# Patient Record
Sex: MALE | Race: WHITE | NOT HISPANIC OR LATINO | Employment: FULL TIME | URBAN - METROPOLITAN AREA
[De-identification: names, ages, dates, MRNs, and addresses within clinical notes are randomized per-mention and may not be internally consistent; named-entity substitution may affect disease eponyms.]

---
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
## 2024-03-05 ENCOUNTER — HOSPITAL ENCOUNTER (EMERGENCY)
Facility: HOSPITAL | Age: 19
Discharge: HOME | End: 2024-03-05
Attending: EMERGENCY MEDICINE

## 2024-03-05 VITALS
RESPIRATION RATE: 16 BRPM | OXYGEN SATURATION: 97 % | WEIGHT: 139 LBS | TEMPERATURE: 98.8 F | BODY MASS INDEX: 20.53 KG/M2 | HEART RATE: 106 BPM | DIASTOLIC BLOOD PRESSURE: 95 MMHG | SYSTOLIC BLOOD PRESSURE: 167 MMHG

## 2024-03-05 DIAGNOSIS — F41.9 ANXIETY: ICD-10-CM

## 2024-03-05 LAB
ATRIAL RATE: 80 BPM
ATRIAL RATE: 84 BPM
GLUCOSE SERPL-MCNC: 99 MG/DL (ref 65–140)
P AXIS: 52 DEGREES
P AXIS: 69 DEGREES
PR INTERVAL: 138 MS
PR INTERVAL: 144 MS
QRS AXIS: 62 DEGREES
QRS AXIS: 62 DEGREES
QRSD INTERVAL: 94 MS
QRSD INTERVAL: 98 MS
QT INTERVAL: 358 MS
QT INTERVAL: 374 MS
QTC INTERVAL: 423 MS
QTC INTERVAL: 431 MS
T WAVE AXIS: 51 DEGREES
T WAVE AXIS: 53 DEGREES
VENTRICULAR RATE: 80 BPM
VENTRICULAR RATE: 84 BPM

## 2024-03-05 PROCEDURE — 93010 ELECTROCARDIOGRAM REPORT: CPT

## 2024-03-05 PROCEDURE — 99285 EMERGENCY DEPT VISIT HI MDM: CPT | Performed by: EMERGENCY MEDICINE

## 2024-03-05 PROCEDURE — 82948 REAGENT STRIP/BLOOD GLUCOSE: CPT

## 2024-03-05 PROCEDURE — 93005 ELECTROCARDIOGRAM TRACING: CPT

## 2024-03-05 PROCEDURE — 99283 EMERGENCY DEPT VISIT LOW MDM: CPT

## 2024-03-05 RX ORDER — LORAZEPAM 1 MG/1
1 TABLET ORAL ONCE
Status: COMPLETED | OUTPATIENT
Start: 2024-03-05 | End: 2024-03-05

## 2024-03-05 RX ORDER — HYDROXYZINE PAMOATE 50 MG/1
50 CAPSULE ORAL 4 TIMES DAILY PRN
Qty: 30 CAPSULE | Refills: 0 | Status: SHIPPED | OUTPATIENT
Start: 2024-03-05

## 2024-03-05 RX ADMIN — LORAZEPAM 1 MG: 1 TABLET ORAL at 16:55

## 2024-03-05 NOTE — ED PROVIDER NOTES
History  Chief Complaint   Patient presents with    Anxiety     Reports about an hour ago he started to feel like he was having a panic attack and began feeling shaky along with heart palpitations. Pt states he feels better now just anxious.     Patient is a 19-year-old male.  He has a history of anxiety.  He was watching [employer] video when all of a sudden he felt shaky.  He developed some palpitations.  No chest pain or shortness of breath.  Felt very anxious as if he was going to die.  No calf pain or hemoptysis.  No unilateral leg swelling.  Patient reports using marijuana, pretty much daily.  No other drug use.  No alcohol use.  He does feel better spontaneously since arrival in the emergency room, but still feels somewhat shaky.  Patient is not suicidal or homicidal.  No hallucinations.        None       Past Medical History:   Diagnosis Date    Known health problems: none        History reviewed. No pertinent surgical history.    Family History   Problem Relation Age of Onset    Alcohol abuse Mother     No Known Problems Father      I have reviewed and agree with the history as documented.    E-Cigarette/Vaping    E-Cigarette Use Never User      E-Cigarette/Vaping Substances    Nicotine Yes      Social History     Tobacco Use    Smoking status: Never    Smokeless tobacco: Never   Vaping Use    Vaping status: Never Used   Substance Use Topics    Alcohol use: Not Currently    Drug use: Yes     Types: Marijuana       Review of Systems   Constitutional:  Negative for chills and fever.   HENT:  Negative for rhinorrhea and sore throat.    Eyes:  Negative for pain, redness and visual disturbance.   Respiratory:  Negative for cough and shortness of breath.    Cardiovascular:  Positive for palpitations. Negative for chest pain and leg swelling.   Gastrointestinal:  Negative for abdominal pain, diarrhea and vomiting.   Endocrine: Negative for polydipsia and polyuria.   Genitourinary:  Negative for dysuria, frequency and  hematuria.   Musculoskeletal:  Negative for back pain and neck pain.   Skin:  Negative for rash and wound.   Allergic/Immunologic: Negative for immunocompromised state.   Neurological:  Negative for weakness, numbness and headaches.   Psychiatric/Behavioral:  Negative for hallucinations and suicidal ideas. The patient is nervous/anxious.    All other systems reviewed and are negative.      Physical Exam  Physical Exam  Vitals reviewed.   Constitutional:       General: He is not in acute distress.     Appearance: He is not toxic-appearing.   HENT:      Head: Normocephalic and atraumatic.      Nose: Nose normal.      Mouth/Throat:      Mouth: Mucous membranes are moist.   Eyes:      General:         Right eye: No discharge.         Left eye: No discharge.      Conjunctiva/sclera: Conjunctivae normal.   Cardiovascular:      Rate and Rhythm: Normal rate and regular rhythm.      Pulses: Normal pulses.      Heart sounds: Normal heart sounds. No murmur heard.     No friction rub. No gallop.   Pulmonary:      Effort: Pulmonary effort is normal. No respiratory distress.      Breath sounds: Normal breath sounds. No stridor. No wheezing, rhonchi or rales.   Abdominal:      General: Bowel sounds are normal. There is no distension.      Palpations: Abdomen is soft.      Tenderness: There is no abdominal tenderness. There is no right CVA tenderness, left CVA tenderness, guarding or rebound.   Musculoskeletal:         General: No swelling, tenderness, deformity or signs of injury. Normal range of motion.      Cervical back: Normal range of motion and neck supple. No rigidity.      Right lower leg: No edema.      Left lower leg: No edema.      Comments: No calf pain or unilateral leg swelling   Skin:     General: Skin is warm and dry.      Coloration: Skin is not jaundiced or pale.      Findings: No bruising, erythema or rash.   Neurological:      General: No focal deficit present.      Mental Status: He is alert and oriented to  "person, place, and time.      Cranial Nerves: No facial asymmetry.      Sensory: No sensory deficit.      Motor: Motor function is intact.   Psychiatric:         Mood and Affect: Mood normal.         Behavior: Behavior normal.         Vital Signs  ED Triage Vitals [03/05/24 1554]   Temperature Pulse Respirations Blood Pressure SpO2   98.8 °F (37.1 °C) (!) 106 16 167/95 97 %      Temp Source Heart Rate Source Patient Position - Orthostatic VS BP Location FiO2 (%)   Oral -- Lying Right arm --      Pain Score       No Pain           Vitals:    03/05/24 1554   BP: 167/95   Pulse: (!) 106   Patient Position - Orthostatic VS: Lying         Visual Acuity      ED Medications  Medications   LORazepam (ATIVAN) tablet 1 mg (1 mg Oral Given 3/5/24 1655)       Diagnostic Studies  Results Reviewed       None                   No orders to display              Procedures  ECG 12 Lead Documentation Only    Date/Time: 3/5/2024 5:29 PM    Performed by: Mehran Hyde MD  Authorized by: Mehran Hyde MD    ECG reviewed by me, the ED Provider: yes    Patient location:  ED  Interpretation:     Interpretation: normal    Rate:     ECG rate assessment: normal    Rhythm:     Rhythm: sinus rhythm    Ectopy:     Ectopy: none    QRS:     QRS axis:  Normal  Conduction:     Conduction: normal    ST segments:     ST segments:  Normal  T waves:     T waves: normal    Comments:      Sinus rhythm with marked sinus arrhythmia.  Possible left atrial enlargement.  Borderline EKG.  No ectopy.  No malignant arrhythmia.  No STEMI.           ED Course         CRAFFT      Flowsheet Row Most Recent Value   CRAFFT Initial Screen: During the past 12 months, did you:    1. Drink any alcohol (more than a few sips)?  No Filed at: 03/05/2024 4824   2. Smoke any marijuana or hashish No Filed at: 03/05/2024 1554   3. Use anything else to get high? (\"anything else\" includes illegal drugs, over the counter and prescription drugs, and things that you sniff or " 'asim')? No Filed at: 03/05/2024 1554                                            Medical Decision Making  Most likely this is anxiety.  There is no malignant arrhythmia on EKG.  Patient is not hypoglycemic.  Doubt anemia or hyperthyroidism.  Patient did feel much improved after Ativan in the emergency room.  This is not pulmonary embolism.  Not focal seizure.  Appropriate for discharge and outpatient management.    Amount and/or Complexity of Data Reviewed  Labs: ordered. Decision-making details documented in ED Course.  ECG/medicine tests: ordered and independent interpretation performed. Decision-making details documented in ED Course.    Risk  Prescription drug management.  Decision regarding hospitalization.             Disposition  Final diagnoses:   Anxiety     Time reflects when diagnosis was documented in both MDM as applicable and the Disposition within this note       Time User Action Codes Description Comment    3/5/2024  5:24 PM Mehran Hyde Add [F41.9] Anxiety     3/5/2024  5:25 PM Mehran Hyde Modify [F41.9] Anxiety           ED Disposition       ED Disposition   Discharge    Condition   Stable    Date/Time   Tue Mar 5, 2024 1724    Comment   Chuy Holman discharge to home/self care.                   Follow-up Information       Follow up With Specialties Details Why Contact [facility] 2nd Floor Family Medicine In 1 week  [address] [facility] [address]ND FLOOR  [facility] [address]  [phone]              Patient's Medications   Discharge Prescriptions    HYDROXYZINE PAMOATE (VISTARIL) 50 MG CAPSULE    Take 1 capsule (50 mg total) by mouth 4 (four) times a day as needed for anxiety       Start Date: 3/5/2024  End Date: --       Order Dose: 50 mg       Quantity: 30 capsule    Refills: 0       No discharge procedures on file.    PDMP Review       None            ED Provider  Electronically Signed by             Mehran Hyde MD  03/05/24 5109

## 2025-04-02 ENCOUNTER — APPOINTMENT (EMERGENCY)
Dept: RADIOLOGY | Facility: HOSPITAL | Age: 20
End: 2025-04-02

## 2025-04-02 ENCOUNTER — HOSPITAL ENCOUNTER (EMERGENCY)
Facility: HOSPITAL | Age: 20
Discharge: HOME | End: 2025-04-02
Attending: EMERGENCY MEDICINE

## 2025-04-02 VITALS
HEART RATE: 89 BPM | BODY MASS INDEX: 21.75 KG/M2 | WEIGHT: 147.27 LBS | TEMPERATURE: 99.7 F | DIASTOLIC BLOOD PRESSURE: 97 MMHG | RESPIRATION RATE: 16 BRPM | SYSTOLIC BLOOD PRESSURE: 153 MMHG | OXYGEN SATURATION: 99 %

## 2025-04-02 DIAGNOSIS — S60.221A CONTUSION OF RIGHT HAND, INITIAL ENCOUNTER: Primary | ICD-10-CM

## 2025-04-02 PROCEDURE — 73130 X-RAY EXAM OF HAND: CPT

## 2025-04-02 PROCEDURE — 99284 EMERGENCY DEPT VISIT MOD MDM: CPT | Performed by: PHYSICIAN ASSISTANT

## 2025-04-02 PROCEDURE — 99283 EMERGENCY DEPT VISIT LOW MDM: CPT

## 2025-04-02 NOTE — ED PROVIDER NOTES
Time reflects when diagnosis was documented in both MDM as applicable and the Disposition within this note       Time User Action Codes Description Comment    4/2/2025 10:05 AM Sandra Ayala Add [L32.339A] Contusion of right hand, initial encounter           ED Disposition       ED Disposition   Discharge    Condition   Stable    Date/Time   Wed Apr 2, 2025 10:05 AM    Comment   Chuy Holman discharge to home/self care.                   Assessment & Plan       Medical Decision Making  20y.o male presents to the ER for right hand pain after punching a metal dumpster today. Vitals are stable. Patient is in no acute distress. On exam, breathing is non-labored. No tachypnea or accessory muscle use. Heart is regular rate. Abdomen is not distended. Swelling seen over the distal right 5th metacarpal. Some ecchymosis seen. No deformity. Area is tender to palpation. Normal ROM of fingers and wrist. Neurovascularly intact. DDX consists of but not limited to: fracture, contusion, dislocation, sprain, strain. Will check imaging.    1004 XR hand 3+ views RIGHT - No acute osseous abnormality.    The management plan was discussed in detail with the patient at bedside and all questions were answered.  Prior to discharge, we provided both verbal and written instructions.  We discussed with the patient the signs and symptoms for which to return to the emergency department.  All questions were answered and patient was comfortable with the plan of care and discharged to home.  Instructed the patient to follow up with the primary care provider and/or specialist provided and their written instructions.  The patient verbalized understanding of our discussion and plan of care, and agrees to return to the Emergency Department for concerns and progression of illness.    At discharge, I instructed the patient to:  -follow up with pcp  -take Tylenol or Motrin for pain  -rest and apply ice to the area  -follow up with the recommended hand  specialist as needed  -return to the ER if symptoms worsened or new symptoms arose  Patient agreed to this plan and was stable at time of discharge.       Problems Addressed:  Contusion of right hand, initial encounter: acute illness or injury    Amount and/or Complexity of Data Reviewed  Independent Historian:      Details: Patient is historian  Radiology: ordered and independent interpretation performed. Decision-making details documented in ED Course.        ED Course as of 04/02/25 1008   Wed Apr 02, 2025   1004 XR hand 3+ views RIGHT  No acute osseous abnormality.       Medications - No data to display    ED Risk Strat Scores                            SBIRT 22yo+      Flowsheet Row Most Recent Value   Initial Alcohol Screen: US AUDIT-C     1. How often do you have a drink containing alcohol? 0 Filed at: 04/02/2025 0901   2. How many drinks containing alcohol do you have on a typical day you are drinking?  0 Filed at: 04/02/2025 0901   3a. Male UNDER 65: How often do you have five or more drinks on one occasion? 0 Filed at: 04/02/2025 0901   Audit-C Score 0 Filed at: 04/02/2025 0901   LESA: How many times in the past year have you...    Used an illegal drug or used a prescription medication for non-medical reasons? Never Filed at: 04/02/2025 0901                            History of Present Illness       Chief Complaint   Patient presents with    Hand Pain     R hand pain after punching metal dumpster today.       Past Medical History:   Diagnosis Date    Known health problems: none       History reviewed. No pertinent surgical history.   Family History   Problem Relation Age of Onset    Alcohol abuse Mother     No Known Problems Father       Social History     Tobacco Use    Smoking status: Never    Smokeless tobacco: Never   Vaping Use    Vaping status: Never Used   Substance Use Topics    Alcohol use: Not Currently    Drug use: Yes     Types: Marijuana      E-Cigarette/Vaping    E-Cigarette Use Never User        E-Cigarette/Vaping Substances    Nicotine Yes       I have reviewed and agree with the history as documented.     20y.o male with no significant PMH presents to the ER for right hand pain after punching a metal dumpster today. He denies taking any medication for pain. He describes his pain as sharp and non-radiating. Symptoms are constant. He is right hand dominant. His last tetanus was in 2021. He denies fever, chills, URI symptoms, chest pain, dyspnea, N/V/D, abdominal pain, weakness or paresthesias.      History provided by:  Patient   used: No        Review of Systems   Constitutional:  Negative for activity change, appetite change, chills and fever.   HENT:  Negative for congestion, drooling, ear discharge, ear pain, facial swelling, rhinorrhea and sore throat.    Eyes:  Negative for redness.   Respiratory:  Negative for cough and shortness of breath.    Cardiovascular:  Negative for chest pain.   Gastrointestinal:  Negative for abdominal pain, diarrhea, nausea and vomiting.   Musculoskeletal:  Negative for neck stiffness.   Skin:  Negative for rash.   Allergic/Immunologic: Negative for food allergies.   Neurological:  Negative for weakness and numbness.           Objective       ED Triage Vitals [04/02/25 0844]   Temperature Pulse Blood Pressure Respirations SpO2 Patient Position - Orthostatic VS   99.7 °F (37.6 °C) 89 153/97 16 99 % Sitting      Temp Source Heart Rate Source BP Location FiO2 (%) Pain Score    Oral Monitor Right arm -- 2      Vitals      Date and Time Temp Pulse SpO2 Resp BP Pain Score FACES Pain Rating User   04/02/25 0844 99.7 °F (37.6 °C) 89 99 % 16 153/97 2 -- AW            Physical Exam  Vitals and nursing note reviewed.   Constitutional:       General: He is not in acute distress.     Appearance: He is not toxic-appearing.   HENT:      Head: Normocephalic and atraumatic.      Mouth/Throat:      Lips: Pink. No lesions.      Mouth: Mucous membranes are moist.    Eyes:      Conjunctiva/sclera: Conjunctivae normal.   Neck:      Trachea: No tracheal deviation.   Cardiovascular:      Rate and Rhythm: Normal rate.   Pulmonary:      Effort: Pulmonary effort is normal. No respiratory distress.   Abdominal:      General: There is no distension.   Musculoskeletal:      Right wrist: Normal.      Right hand: Swelling, tenderness and bony tenderness present. No deformity. Normal range of motion. Normal strength. Normal sensation. Normal pulse.        Hands:       Cervical back: Normal range of motion and neck supple.   Skin:     General: Skin is warm and dry.      Findings: No rash.   Neurological:      Mental Status: He is alert.      GCS: GCS eye subscore is 4. GCS verbal subscore is 5. GCS motor subscore is 6.   Psychiatric:         Mood and Affect: Mood normal.         Results Reviewed       None            XR hand 3+ views RIGHT   ED Interpretation by Sandra Ayala PA-C (04/02 1005)   No acute abnormalities seen by me at this time.      Final Interpretation by Gary Hartman DO (04/02 1002)      No acute osseous abnormality.         Computerized Assisted Algorithm (CAA) may have been used to analyze all applicable images.         Workstation performed: [id number]             Procedures    ED Medication and Procedure Management   Prior to Admission Medications   Prescriptions Last Dose Informant Patient Reported? Taking?   hydrOXYzine pamoate (VISTARIL) 50 mg capsule   No No   Sig: Take 1 capsule (50 mg total) by mouth 4 (four) times a day as needed for anxiety      Facility-Administered Medications: None     Patient's Medications   Discharge Prescriptions    No medications on file     No discharge procedures on file.  ED SEPSIS DOCUMENTATION   Time reflects when diagnosis was documented in both MDM as applicable and the Disposition within this note       Time User Action Codes Description Comment    4/2/2025 10:05 AM Sandra Ayala Add [V73.445K] Contusion of  right hand, initial encounter                  Sandra Ayala PA-C  04/02/25 1007

## 2025-04-02 NOTE — DISCHARGE INSTRUCTIONS
DISCHARGE INSTRUCTIONS:    FOLLOW UP WITH YOUR PRIMARY CARE PROVIDER OR THE [facility]. MAKE AN APPOINTMENT TO BE SEEN.     TAKE TYLENOL OR MOTRIN FOR PAIN.    REST AND APPLY ICE TO THE HAND.    IF SYMPTOMS WORSEN OR NEW SYMPTOMS ARISE, RETURN TO THE ER TO BE SEEN.